# Patient Record
Sex: MALE | Race: WHITE | NOT HISPANIC OR LATINO | Employment: UNEMPLOYED | URBAN - METROPOLITAN AREA
[De-identification: names, ages, dates, MRNs, and addresses within clinical notes are randomized per-mention and may not be internally consistent; named-entity substitution may affect disease eponyms.]

---

## 2017-12-03 ENCOUNTER — HOSPITAL ENCOUNTER (EMERGENCY)
Facility: HOSPITAL | Age: 52
Discharge: HOME | End: 2017-12-04
Attending: EMERGENCY MEDICINE | Admitting: EMERGENCY MEDICINE
Payer: COMMERCIAL

## 2017-12-03 DIAGNOSIS — T40.1X1A HEROIN OVERDOSE (HCC): Primary | ICD-10-CM

## 2017-12-03 PROCEDURE — 93005 ELECTROCARDIOGRAM TRACING: CPT

## 2017-12-04 VITALS
TEMPERATURE: 97.4 F | DIASTOLIC BLOOD PRESSURE: 109 MMHG | RESPIRATION RATE: 16 BRPM | WEIGHT: 190 LBS | HEART RATE: 84 BPM | HEIGHT: 70 IN | OXYGEN SATURATION: 95 % | BODY MASS INDEX: 27.2 KG/M2 | SYSTOLIC BLOOD PRESSURE: 179 MMHG

## 2017-12-04 PROCEDURE — 99284 EMERGENCY DEPT VISIT MOD MDM: CPT

## 2017-12-04 NOTE — ED NOTES
EKG completed at 0039  Viewed and signed by Dr Aryan Adhikari on chart     [facility]  12/03/17 8963

## 2017-12-04 NOTE — ED ATTENDING ATTESTATION
Alesia Rea MD, saw and evaluated the patient  All available labs and X-rays were ordered by me or the resident and have been reviewed by myself  I discussed the patient with the resident / non-physician and agree with the resident's / non-physician practitioner's findings and plan as documented in the resident's / non-physician practicitioner's note, except where noted  At this point, I agree with the current assessment done in the ED  Chief Complaint   Patient presents with    Heroin Overdose - Accidental     Pt injected a bag or 2 of heroin, police called for unconsious, cpr was started  Pt received 2 mg IN narcan  Pt became conscious  This is a 46year old male presenting for evaluation of accidental overdose  The patient states that he uses heroin everyday, about 2 bags normally  He hasn't changed dealers or anything  Tonight he was with a friend, injected 2 bags of heroin + Klonipin orally and at some point wasn't breathing as well as he should and so the friend called EMS  He had brief CPR and was given IN narcan with complete resolution of symptoms  Patient denies any complaints at this time  Denies f/ch/n/v/cp/sob  Denies dizziness/LH  Denies falls, trauma  Denies diarrhea, constipation  Has had overdoses in the past    Denies any long acting agents  PMH:  - Hepatitis C  - HTN: non compliant with medications  PSH:  - None  Smokes daily  No alcohol/drugs  PE:  Vitals:    12/03/17 2343 12/04/17 0045   BP: (!) 209/129 (!) 179/109   Pulse: 101 84   Resp: 16 16   Temp: (!) 97 4 °F (36 3 °C)    TempSrc: Oral    SpO2: 96% 95%   Weight: 86 2 kg (190 lb)    Height: 5' 10" (1 778 m)    General: VSS, NAD, awake, alert  Well-nourished, well-developed  Appears stated age  Head: Normocephalic, atraumatic, nontender  Eyes: PERRL, EOM-I  No diplopia  No hyphema  No subconjunctival hemorrhages  Symmetrical lids  ENT: Atraumatic external nose and ears  MMM  No malocclusion   No stridor  Normal phonation  No drooling  Normal swallowing  Neck: Symmetric, trachea midline  No JVD  CV: RRR  +S1/S2  No murmurs or gallops  Peripheral pulses +2 throughout  No chest wall tenderness  Lungs:   Unlabored No retractions  CTAB, lungs sounds equal bilateral    No crepitus  No tachypnea  No paradoxical motion  Abd: +BS, soft, NT/ND    MSK:   FROM   No lower extremity edema  Back:   No C/T/L-spine tenderness  No CVAT  Skin: Dry, intact  Neuro: AAOx3, GCS 15, CN II-XII grossly intact  Motor grossly intact  Psychiatric/Behavioral: Appropriate mood and affect   Exam: deferred  A:  - Heroin overdose  P:  - S/P narcan, obs 1 hour  If mentating normally at that point when the narcan has worn off, will DC home  - Patient not interesting in resources to quit drug abuse  - No reason for CRISIS evaluation; accidental OD with no SI / intent  - 13 point ROS was performed and all are normal unless stated in the history above  - Nursing note reviewed  Vitals reviewed  - Orders placed by myself and/or advanced practitioner / resident     - Previous chart was not reviewed  - No language barrier    - History obtained from patient  - There are no limitations to the history obtained  - Critical care time: Not applicable for this patient  Final Diagnosis:  1  Heroin overdose        ED Course as of Dec 04 0058   Mon Dec 04, 2017   0000 Procedure Note: EKG  Date/Time: 12/04/17 12:00 AM   Performed by: Susana Collins by: Joy Session   Indications / Diagnosis: OD  ECG reviewed by me, the ED Provider: yes   The EKG demonstrates:  Rhythm: HR 80 normal sinus  Intervals: normal intervals  Axis: normal axis  QRS/Blocks: normal QRS  ST Changes: Mild STD in V5 V6  No old for comparison  Non-specific abnormalities  0121 Patient still awake, alert, NAD  Will DC home  Advised use of his home anti-hypertensives previously         Medications    EMS REPLENISHMENT MED (not administered)     No orders to display     No orders of the defined types were placed in this encounter  Labs Reviewed - No data to display  Time reflects when diagnosis was documented in both MDM as applicable and the Disposition within this note     Time User Action Codes Description Comment    12/4/2017 12:37 AM Daisy, [address] Heroin overdose       ED Disposition     ED Disposition Condition Comment    Discharge  Millicent Blajono discharge to home/self care  Condition at discharge: Good        Follow-up Information     Follow up With Specialties Details Why Contact Info Additional [address] Emergency Department Emergency Medicine Go to If symptoms worsen [address] [place] [address], [address] [place] [address], [id number]        Patient's Medications    No medications on file     No discharge procedures on file  None       Portions of the record may have been created with voice recognition software  Occasional wrong word or "sound a like" substitutions may have occurred due to the inherent limitations of voice recognition software  Read the chart carefully and recognize, using context, where substitutions have occurred      Electronically signed by:  Zain Macdonald

## 2017-12-04 NOTE — ED PROVIDER NOTES
History  Chief Complaint   Patient presents with    Heroin Overdose - Accidental     Pt injected a bag or 2 of heroin, police called for unconsious, cpr was started  Pt received 2 mg IN narcan  Pt became conscious  HPI  This is a 25-year-old male who presents today with a heroin overdose  He states he was at his friends house doing heroin  He injected 2 bags of heroin and  were called because patient stopped breathing  Friends started doing cpr  Patient received 2mg of narcan and he woke up  States he gets heroin when same supplier  Denies any other drug ingestion  No alcohol use  Currently asympatomatic with normal mental status  No chest pain, sob, abdominal pain  No weakness, numbness or tingling  Impression: heroin overdose  Will monitor patient and discharge  Advised to stop doing drugs  None       Past Medical History:   Diagnosis Date    Hepatitis C     Hypertension        History reviewed  No pertinent surgical history  History reviewed  No pertinent family history  I have reviewed and agree with the history as documented  Social History   Substance Use Topics    Smoking status: Current Every Day Smoker    Smokeless tobacco: Never Used    Alcohol use No        Review of Systems   Constitutional: Negative  HENT: Negative  Eyes: Negative  Respiratory: Negative  Cardiovascular: Negative  Gastrointestinal: Negative  Endocrine: Negative  Genitourinary: Negative  Musculoskeletal: Negative  Neurological: Negative  Hematological: Negative  Psychiatric/Behavioral: Negative  All other systems reviewed and are negative        Physical Exam  ED Triage Vitals   Temperature Pulse Respirations Blood Pressure SpO2   12/03/17 2343 12/03/17 2343 12/03/17 2343 12/03/17 2343 12/03/17 2343   (!) 97 4 °F (36 3 °C) 101 16 (!) 209/129 96 %      Temp Source Heart Rate Source Patient Position - Orthostatic VS BP Location FiO2 (%)   12/03/17 2343 12/04/17 0045 12/04/17 0045 12/04/17 0045 --   Oral Monitor Sitting Right arm       Pain Score       12/03/17 2343       No Pain           Orthostatic Vital Signs  Vitals:    12/03/17 2343 12/04/17 0045   BP: (!) 209/129 (!) 179/109   Pulse: 101 84   Patient Position - Orthostatic VS:  Sitting       Physical Exam   Constitutional: He is oriented to person, place, and time  He appears well-developed and well-nourished  No distress  HENT:   Head: Normocephalic  Mouth/Throat: Oropharynx is clear and moist    Eyes: Conjunctivae and EOM are normal    Pinpoint pupil reactive to light    Neck: Normal range of motion  Neck supple  Cardiovascular: Normal rate, regular rhythm and normal heart sounds  No murmur heard  Pulmonary/Chest: Effort normal and breath sounds normal  No respiratory distress  He has no wheezes  Abdominal: Soft  Bowel sounds are normal  He exhibits no distension  Musculoskeletal: Normal range of motion  He exhibits no edema or deformity  Neurological: He is alert and oriented to person, place, and time  Skin: Skin is warm  Capillary refill takes less than 2 seconds  He is not diaphoretic  Psychiatric: He has a normal mood and affect  Vitals reviewed        ED Medications  Medications - No data to display    Diagnostic Studies  Results Reviewed     None                 No orders to display         Procedures  Procedures      Phone Consults  ED Phone Contact    ED Course  ED Course as of Dec 04 1605   Mon Dec 04, 2017   0057 Patient back to baseline, will discharge home                                [facility]  CritCare Time    Disposition  Final diagnoses:   Heroin overdose     Time reflects when diagnosis was documented in both MDM as applicable and the Disposition within this note     Time User Action Codes Description Comment    12/4/2017 12:37 AM Daisy, [address] Heroin overdose       ED Disposition     ED Disposition Condition Comment    Discharge  Blanka Mae discharge to home/self care     Condition at discharge: Good        Follow-up Information     Follow up With Specialties Details Why Contact Info Additional [address] Emergency Department Emergency Medicine Go to If symptoms worsen [address]  [phone]  ED, [address] [place] [address], [id number]        There are no discharge medications for this patient  No discharge procedures on file  ED Provider  Attending physically available and evaluated Les Henry I managed the patient along with the ED Attending      Electronically Signed by         Riri Lawson MD  Resident  12/04/17 7197

## 2017-12-04 NOTE — DISCHARGE INSTRUCTIONS
Narcotic Abuse   WHAT YOU NEED TO KNOW:   Narcotics are medicines used to decrease or take away severe pain  Narcotics may also be called opioids  Some common names of narcotics ordered by a doctor are codeine and morphine  Heroin is an illegal street drug that is made from morphine  DISCHARGE INSTRUCTIONS:   Return to the emergency department if:   · You are very drowsy  · Your speech is slurred  · You have trouble thinking, remembering things, or focusing  Contact your healthcare provider if:   · You want help or information on how to stop using or abusing narcotics  Follow up with your healthcare provider as directed:  Write down your questions so you remember to ask them during your visits  Narcotic intoxication  usually lasts for several hours  You may have the following during or after you use narcotics:  · Behavior or mood changes, such as a great feeling followed by the feeling that you do not care about anyone or anything    · Trouble thinking, remembering things, or focusing    · Small pupils    · Feeling very drowsy    · Slurred speech  Narcotic withdrawal  occurs if you stop using narcotics after using them heavily over a period of time  Signs and symptoms may begin within minutes or days and continue for days or even months:  · Depression and anxiety    · Nausea or vomiting    · Muscle aches    · Watery eyes or runny nose    · Large pupils    · Sweating or goosebumps on your skin    · Diarrhea    · Fever    · Trouble sleeping  How narcotics can harm a pregnant woman and her baby:   · Tell your healthcare provider right away if you are trying to get pregnant or you are pregnant and you are using narcotics  Your doctor may suggest other medicines to control pain and prevent withdrawal  If you go through withdrawal while pregnant, you may miscarry your baby, or he may be stillborn  He may be very small and have other medical problems      · When your baby is born, he may show signs of withdrawal  This includes unexpected weight loss, poor feeding, and more crying than normal  Your baby may also have a fever, vomit, and have diarrhea  He may also have learning problems or other health issues when he gets older  If you have a baby and you are using narcotics, you may have trouble caring for your baby  Narcotics may be passed to your baby through breast milk  Talk to your healthcare provider before breastfeeding your baby if you are using narcotics  For support and more information:   · Substance Abuse and [address] , [address] [place]  Web Address: https://All[employer]/  · [facility] on Drug Abuse  [address] [place] [id number]  Phone: 4- 571 - 606-5773  Web Address: www nikole nih gov  © 2017 [facility] [address] is for End User's use only and may not be sold, redistributed or otherwise used for commercial purposes  All illustrations and images included in CareNotes® are the copyrighted property of A D A M [employer]  or [employer]  The above information is an  only  It is not intended as medical advice for individual conditions or treatments  Talk to your doctor, nurse or pharmacist before following any medical regimen to see if it is safe and effective for you

## 2017-12-05 LAB
ATRIAL RATE: 80 BPM
P AXIS: 41 DEGREES
PR INTERVAL: 152 MS
QRS AXIS: 30 DEGREES
QRSD INTERVAL: 86 MS
QT INTERVAL: 400 MS
QTC INTERVAL: 461 MS
T WAVE AXIS: 128 DEGREES
VENTRICULAR RATE: 80 BPM